# Patient Record
Sex: FEMALE | Race: WHITE | NOT HISPANIC OR LATINO | URBAN - NONMETROPOLITAN AREA
[De-identification: names, ages, dates, MRNs, and addresses within clinical notes are randomized per-mention and may not be internally consistent; named-entity substitution may affect disease eponyms.]

---

## 2017-02-03 NOTE — PATIENT DISCUSSION
(L82.1) Other seborrheic keratosis - Assesment : Examination revealed seborrheic keratosis right lower lid - Plan : Minor Surgery RLL TODAY  BACITRACIN ERICK APPLIED TO SITE AFTER PROCEDURE  EMYCIN ERICK SENT IN [place].  TID X 1 WEEK THEN STOP  F/U PRN

## 2017-03-06 NOTE — PATIENT DISCUSSION
(P13.778) Keratoconjunct sicca, not specified as Sjogren's, bilateral - Assesment : Examination revealed Dry Eye Syndrome - Plan : Monitor for changes. Advised patient to call our office with decreased vision or increased symptoms. Continue artificial tears 2-3 times a day, recommend Systane Balance. Start Systane gel OU QHS. Instructions written down.   RTC 1 month follow up

## 2017-04-06 NOTE — PATIENT DISCUSSION
(T45.672) Keratoconjunct sicca, not specified as Sjogren's, bilateral - Assesment : Examination revealed Dry Eye Syndrome. Much improved since BRINDA treatment per patient. - Plan : Monitor for changes. Advised patient to call our office with decreased vision or increased symptoms. Continue artificial tears 2-3 times a day, continue Systane Balance. Continue Systane gel OU QHS.   [facility]  11/2017 Exam/Octo

## 2017-10-04 NOTE — PATIENT DISCUSSION
(H01.004) Unspecified blepharitis left upper eyelid - Assesment : Examination revealed Blepharitis/Meibomianitis. - Plan : see plan #2.

## 2017-10-04 NOTE — PATIENT DISCUSSION
(H42.6112) Primary open-angle glaucoma bilateral mild stage - Assesment : Examination revealed Primary Open Angle Glaucoma. IOP stable OU today. - Plan : Monitor for changes. Pt to call with increased symptoms or decreased vision. CPM: LATANOPROST OU QHS. RTC 6 MOS MAC/ONH OCT + TN CHECK.

## 2017-10-04 NOTE — PATIENT DISCUSSION
(H01.001) Unspecified blepharitis right upper eyelid - Assesment : Examination revealed Blepharitis/Meibomianitis. - Plan : Monitor for changes. Pt to call with increased symptoms or decreased vision. Warm compresses OU QHS x 1 month, then 2x weekly after that.

## 2017-10-04 NOTE — PATIENT DISCUSSION
(H35.363) Drusen (degenerative) of macula, bilateral - Assesment : Examination revealed Drusen. Discussed this is a precursor to AMD, but not AMD yet. - Plan : Monitor for changes. Advised patient to call our office with decreased vision or increased symptoms. MAC OCT at next visit.

## 2017-10-04 NOTE — PATIENT DISCUSSION
(Q79.130) Vitreous degeneration, left eye - Assesment : Examination revealed PVD - Plan : Monitor for changes. Advised patient to call our office with decreased vision or an increase in flashes and/or floaters.

## 2017-10-04 NOTE — PATIENT DISCUSSION
(G08.865) Keratoconjunct sicca, not specified as Sjogren's, bilateral - Assesment : Examination revealed Dry Eye Syndrome - Plan : Monitor for changes. Advised patient to call our office with decreased vision or increased symptoms. Continue PF AT's: OU TID-QID. Pt brought in form for the [facility]- form filled out and pt informed that her vision SC meets the DVM requirements to drive without correction. Rx glasses for distance would give slight help for night driving if pt chooses to fill new rx, otherwise may fill as reading rx only. RTC 6 MOS MAC/ONH OCT + TN CHECK.

## 2019-06-18 ENCOUNTER — IMPORTED ENCOUNTER (OUTPATIENT)
Dept: URBAN - NONMETROPOLITAN AREA CLINIC 1 | Facility: CLINIC | Age: 44
End: 2019-06-18

## 2019-06-18 PROCEDURE — 92310 CONTACT LENS FITTING OU: CPT

## 2019-06-18 PROCEDURE — 92014 COMPRE OPH EXAM EST PT 1/>: CPT

## 2019-06-18 PROCEDURE — 92015 DETERMINE REFRACTIVE STATE: CPT

## 2019-06-18 NOTE — PATIENT DISCUSSION
Compound Myopic Astigmatism OU w/Presbyopia-  discussed findings w/patient-  new spectacle/CL Rx issued-  monthly CL trials dispenssed to patient-  recommend +1.00 - +1.25 NVOs for over CL use at NV-  monitor yearly or prn; 's Notes: MR 6/18/2019DFE 6/18/2019

## 2020-12-03 ENCOUNTER — IMPORTED ENCOUNTER (OUTPATIENT)
Dept: URBAN - NONMETROPOLITAN AREA CLINIC 1 | Facility: CLINIC | Age: 45
End: 2020-12-03

## 2020-12-03 ENCOUNTER — PREPPED CHART (OUTPATIENT)
Dept: URBAN - NONMETROPOLITAN AREA CLINIC 4 | Facility: CLINIC | Age: 45
End: 2020-12-03

## 2020-12-03 PROBLEM — H52.4: Noted: 2020-12-03

## 2020-12-03 PROCEDURE — 92015 DETERMINE REFRACTIVE STATE: CPT

## 2020-12-03 PROCEDURE — 92014 COMPRE OPH EXAM EST PT 1/>: CPT

## 2020-12-03 PROCEDURE — 92310 CONTACT LENS FITTING OU: CPT

## 2020-12-03 NOTE — PATIENT DISCUSSION
Compound Myopic Astigmatism OU w/Presbyopia-  discussed findings w/patient-  new spectacle/CL Rx issued-  patient defers change of CL's at this time she is going to try OTC readers of +1.00-1. 25-  RTC 1 year or prn; 's Notes: MR 12/3/2020DFE 12/3/2020

## 2022-03-08 ENCOUNTER — COMPREHENSIVE EXAM (OUTPATIENT)
Dept: URBAN - NONMETROPOLITAN AREA CLINIC 4 | Facility: CLINIC | Age: 47
End: 2022-03-08

## 2022-03-08 DIAGNOSIS — H52.4: ICD-10-CM

## 2022-03-08 DIAGNOSIS — H52.223: ICD-10-CM

## 2022-03-08 DIAGNOSIS — H52.13: ICD-10-CM

## 2022-03-08 PROCEDURE — 92310 CONTACT LENS FITTING OU: CPT

## 2022-03-08 PROCEDURE — 92015 DETERMINE REFRACTIVE STATE: CPT

## 2022-03-08 PROCEDURE — 92014 COMPRE OPH EXAM EST PT 1/>: CPT

## 2022-03-08 ASSESSMENT — TONOMETRY
OD_IOP_MMHG: 15
OS_IOP_MMHG: 15

## 2022-04-09 ASSESSMENT — VISUAL ACUITY
OS_SC: 20/20
OU_SC: J1+
OD_SC: 20/20
OS_SC: 20/20
OD_SC: 20/25-2 BLURRY
OU_SC: 20/20
OU_CC: J1+
OU_OTHER: 20/40.

## 2022-04-09 ASSESSMENT — TONOMETRY
OS_IOP_MMHG: 14
OD_IOP_MMHG: 15
OS_IOP_MMHG: 15
OD_IOP_MMHG: 15